# Patient Record
Sex: MALE | Race: ASIAN | NOT HISPANIC OR LATINO | URBAN - METROPOLITAN AREA
[De-identification: names, ages, dates, MRNs, and addresses within clinical notes are randomized per-mention and may not be internally consistent; named-entity substitution may affect disease eponyms.]

---

## 2017-01-25 ENCOUNTER — OUTPATIENT (OUTPATIENT)
Dept: OUTPATIENT SERVICES | Facility: HOSPITAL | Age: 67
LOS: 1 days | End: 2017-01-25
Payer: COMMERCIAL

## 2017-01-25 DIAGNOSIS — I25.118 ATHEROSCLEROTIC HEART DISEASE OF NATIVE CORONARY ARTERY WITH OTHER FORMS OF ANGINA PECTORIS: ICD-10-CM

## 2017-01-25 PROCEDURE — 78452 HT MUSCLE IMAGE SPECT MULT: CPT

## 2017-01-25 PROCEDURE — A9502: CPT

## 2017-01-25 PROCEDURE — 93017 CV STRESS TEST TRACING ONLY: CPT

## 2019-06-28 VITALS
TEMPERATURE: 98 F | DIASTOLIC BLOOD PRESSURE: 67 MMHG | HEIGHT: 69 IN | OXYGEN SATURATION: 100 % | HEART RATE: 54 BPM | WEIGHT: 134.04 LBS | RESPIRATION RATE: 16 BRPM | SYSTOLIC BLOOD PRESSURE: 115 MMHG

## 2019-06-28 RX ORDER — CHLORHEXIDINE GLUCONATE 213 G/1000ML
1 SOLUTION TOPICAL ONCE
Refills: 0 | Status: DISCONTINUED | OUTPATIENT
Start: 2019-07-01 | End: 2019-07-01

## 2019-06-28 NOTE — H&P ADULT - NSICDXPASTMEDICALHX_GEN_ALL_CORE_FT
PAST MEDICAL HISTORY:  CAD (coronary artery disease)     DM (diabetes mellitus)     HLD (hyperlipidemia)     HTN (hypertension)

## 2019-06-28 NOTE — H&P ADULT - ASSESSMENT
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

## 2019-06-28 NOTE — H&P ADULT - HISTORY OF PRESENT ILLNESS
69 y o male former smoker with PMH of CAD s/p CABG in 2004 with subsequent POBA of distal LIMA to LAD in 5/2007, s/p PCI in 5/2007 pLAD 100%, pLcx 90%, mLCx 100%, to LCX, HLD, HTN, DM presented to his doctor c/o exertional left-sided mild to moderate in intensity non-radiating CP with ambulation less than 1/2 block. Pt denies SOB, dizziness, palpitations, LE edema, PND/orthopnea, syncope, n/v, fever/chills, blood in the stool. Last ECHO done on 2/17 showed normal LV size and systolic function, basal inferior wall hypokinesis with EF of 62 %, grade I diastolic dysfunction, mild MR/TR/NE. NST done on 2/17 indicated LV normal; small mild defect in distal inferior wall that is partially reversible, distal inferior wall hypokinesis with EF of 70%. In light of pt's risk factors, symptoms mentioned above, abnormal NST and prior history of CAD, pt is now referred to [facility] for recommended cardiac cath with possible intervention. skeleton    69 y o male former smoker with PMH of CAD s/p CABG in 2004 with subsequent POBA of distal LIMA to LAD in 5/2007, s/p PCI in 5/2007 pLAD 100%, pLcx 90%, mLCx 100%, to LCX, HLD, HTN, DM presented to his doctor c/o exertional left-sided mild to moderate in intensity non-radiating CP with ambulation less than 1/2 block. Pt denies SOB, dizziness, palpitations, LE edema, PND/orthopnea, syncope, n/v, fever/chills, blood in the stool. Last ECHO done on 2/17 showed normal LV size and systolic function, basal inferior wall hypokinesis with EF of 62 %, grade I diastolic dysfunction, mild MR/TR/TX. NST done on 2/17 indicated LV normal; small mild defect in distal inferior wall that is partially reversible, distal inferior wall hypokinesis with EF of 70%. In light of pt's risk factors, symptoms mentioned above, abnormal NST and prior history of CAD, pt is now referred to [facility] for recommended cardiac cath with possible intervention. 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 Pt denies SOB, dizziness, palpitations, LE edema, PND/orthopnea, syncope, n/v, fever/chills, blood in the stool. Last ECHO done on 2/17 showed normal LV size and systolic function, basal inferior wall hypokinesis with EF of 62 %, grade I diastolic dysfunction, mild MR/TR/SD. NST done on 2/17 indicated LV normal; small mild defect in distal inferior wall that is partially reversible, distal inferior wall hypokinesis with EF of 70%. In light of pt's risk factors, symptoms mentioned above, abnormal NST and prior history of CAD, pt is now referred to [facility] for recommended cardiac cath with possible intervention. 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 Pt denies SOB, dizziness, palpitations, LE edema, PND/orthopnea, syncope, n/v, fever/chills, blood in the stool. Last ECHO done in 2/2017 showed normal LV size and systolic function, basal inferior wall hypokinesis with EF of 62 %, grade I diastolic dysfunction, mild MR/TR/NM. NST also done in 2/17 indicated LV normal; small mild defect in distal inferior wall that is partially reversible, distal inferior wall hypokinesis with EF of 70%.  Pt deferred cardiac cath at the time.    In light of pt's risk factors, symptoms mentioned above, abnormal NST and prior history of CAD, pt is now referred to [facility] for recommended cardiac cath with possible intervention for suspected CAD progression.

## 2019-07-01 ENCOUNTER — OUTPATIENT (OUTPATIENT)
Dept: OUTPATIENT SERVICES | Facility: HOSPITAL | Age: 69
LOS: 1 days | Discharge: NURSING, REHAB OR LONG-TERM CARE | End: 2019-07-01
Payer: MEDICARE

## 2019-07-01 DIAGNOSIS — Z95.1 PRESENCE OF AORTOCORONARY BYPASS GRAFT: Chronic | ICD-10-CM

## 2019-07-01 LAB
ALBUMIN SERPL ELPH-MCNC: 4.4 G/DL — SIGNIFICANT CHANGE UP (ref 3.3–5)
ALP SERPL-CCNC: 68 U/L — SIGNIFICANT CHANGE UP (ref 40–120)
ALT FLD-CCNC: 12 U/L — SIGNIFICANT CHANGE UP (ref 10–45)
ANION GAP SERPL CALC-SCNC: 10 MMOL/L — SIGNIFICANT CHANGE UP (ref 5–17)
APTT BLD: 27.6 SEC — SIGNIFICANT CHANGE UP (ref 27.5–36.3)
AST SERPL-CCNC: 17 U/L — SIGNIFICANT CHANGE UP (ref 10–40)
BASOPHILS # BLD AUTO: 0.05 K/UL — SIGNIFICANT CHANGE UP (ref 0–0.2)
BASOPHILS NFR BLD AUTO: 0.6 % — SIGNIFICANT CHANGE UP (ref 0–2)
BILIRUB SERPL-MCNC: 0.4 MG/DL — SIGNIFICANT CHANGE UP (ref 0.2–1.2)
BUN SERPL-MCNC: 14 MG/DL — SIGNIFICANT CHANGE UP (ref 7–23)
CALCIUM SERPL-MCNC: 9 MG/DL — SIGNIFICANT CHANGE UP (ref 8.4–10.5)
CHLORIDE SERPL-SCNC: 106 MMOL/L — SIGNIFICANT CHANGE UP (ref 96–108)
CHOLEST SERPL-MCNC: 104 MG/DL — SIGNIFICANT CHANGE UP (ref 10–199)
CK MB CFR SERPL CALC: 3.3 NG/ML — SIGNIFICANT CHANGE UP (ref 0–6.7)
CK SERPL-CCNC: 143 U/L — SIGNIFICANT CHANGE UP (ref 30–200)
CO2 SERPL-SCNC: 25 MMOL/L — SIGNIFICANT CHANGE UP (ref 22–31)
CREAT SERPL-MCNC: 0.81 MG/DL — SIGNIFICANT CHANGE UP (ref 0.5–1.3)
CRP SERPL-MCNC: <0.03 MG/DL — SIGNIFICANT CHANGE UP (ref 0–0.4)
EOSINOPHIL # BLD AUTO: 0.27 K/UL — SIGNIFICANT CHANGE UP (ref 0–0.5)
EOSINOPHIL NFR BLD AUTO: 3.1 % — SIGNIFICANT CHANGE UP (ref 0–6)
GLUCOSE BLDC GLUCOMTR-MCNC: 91 MG/DL — SIGNIFICANT CHANGE UP (ref 70–99)
GLUCOSE SERPL-MCNC: 82 MG/DL — SIGNIFICANT CHANGE UP (ref 70–99)
HBA1C BLD-MCNC: 7.3 % — HIGH (ref 4–5.6)
HCT VFR BLD CALC: 34.4 % — LOW (ref 39–50)
HDLC SERPL-MCNC: 51 MG/DL — SIGNIFICANT CHANGE UP
HGB BLD-MCNC: 10.7 G/DL — LOW (ref 13–17)
IMM GRANULOCYTES NFR BLD AUTO: 0.3 % — SIGNIFICANT CHANGE UP (ref 0–1.5)
INR BLD: 1 — SIGNIFICANT CHANGE UP (ref 0.88–1.16)
LIPID PNL WITH DIRECT LDL SERPL: 40 MG/DL — SIGNIFICANT CHANGE UP
LYMPHOCYTES # BLD AUTO: 2.83 K/UL — SIGNIFICANT CHANGE UP (ref 1–3.3)
LYMPHOCYTES # BLD AUTO: 32.9 % — SIGNIFICANT CHANGE UP (ref 13–44)
MCHC RBC-ENTMCNC: 25.2 PG — LOW (ref 27–34)
MCHC RBC-ENTMCNC: 31.1 GM/DL — LOW (ref 32–36)
MCV RBC AUTO: 81.1 FL — SIGNIFICANT CHANGE UP (ref 80–100)
MONOCYTES # BLD AUTO: 0.81 K/UL — SIGNIFICANT CHANGE UP (ref 0–0.9)
MONOCYTES NFR BLD AUTO: 9.4 % — SIGNIFICANT CHANGE UP (ref 2–14)
NEUTROPHILS # BLD AUTO: 4.61 K/UL — SIGNIFICANT CHANGE UP (ref 1.8–7.4)
NEUTROPHILS NFR BLD AUTO: 53.7 % — SIGNIFICANT CHANGE UP (ref 43–77)
NRBC # BLD: 0 /100 WBCS — SIGNIFICANT CHANGE UP (ref 0–0)
PLATELET # BLD AUTO: 198 K/UL — SIGNIFICANT CHANGE UP (ref 150–400)
POTASSIUM SERPL-MCNC: 4.2 MMOL/L — SIGNIFICANT CHANGE UP (ref 3.5–5.3)
POTASSIUM SERPL-SCNC: 4.2 MMOL/L — SIGNIFICANT CHANGE UP (ref 3.5–5.3)
PROT SERPL-MCNC: 7.4 G/DL — SIGNIFICANT CHANGE UP (ref 6–8.3)
PROTHROM AB SERPL-ACNC: 11.3 SEC — SIGNIFICANT CHANGE UP (ref 10–12.9)
RBC # BLD: 4.24 M/UL — SIGNIFICANT CHANGE UP (ref 4.2–5.8)
RBC # FLD: 18.3 % — HIGH (ref 10.3–14.5)
SODIUM SERPL-SCNC: 141 MMOL/L — SIGNIFICANT CHANGE UP (ref 135–145)
TOTAL CHOLESTEROL/HDL RATIO MEASUREMENT: 2 RATIO — LOW (ref 3.4–9.6)
TRIGL SERPL-MCNC: 64 MG/DL — SIGNIFICANT CHANGE UP (ref 10–149)
WBC # BLD: 8.6 K/UL — SIGNIFICANT CHANGE UP (ref 3.8–10.5)
WBC # FLD AUTO: 8.6 K/UL — SIGNIFICANT CHANGE UP (ref 3.8–10.5)

## 2019-07-01 PROCEDURE — 85610 PROTHROMBIN TIME: CPT

## 2019-07-01 PROCEDURE — 82550 ASSAY OF CK (CPK): CPT

## 2019-07-01 PROCEDURE — C1894: CPT

## 2019-07-01 PROCEDURE — 80061 LIPID PANEL: CPT

## 2019-07-01 PROCEDURE — 85025 COMPLETE CBC W/AUTO DIFF WBC: CPT

## 2019-07-01 PROCEDURE — 82962 GLUCOSE BLOOD TEST: CPT

## 2019-07-01 PROCEDURE — 93459 L HRT ART/GRFT ANGIO: CPT

## 2019-07-01 PROCEDURE — C1769: CPT

## 2019-07-01 PROCEDURE — 80053 COMPREHEN METABOLIC PANEL: CPT

## 2019-07-01 PROCEDURE — 93005 ELECTROCARDIOGRAM TRACING: CPT

## 2019-07-01 PROCEDURE — 86140 C-REACTIVE PROTEIN: CPT

## 2019-07-01 PROCEDURE — C1887: CPT

## 2019-07-01 PROCEDURE — 82553 CREATINE MB FRACTION: CPT

## 2019-07-01 PROCEDURE — 99203 OFFICE O/P NEW LOW 30 MIN: CPT

## 2019-07-01 PROCEDURE — 83036 HEMOGLOBIN GLYCOSYLATED A1C: CPT

## 2019-07-01 PROCEDURE — 85730 THROMBOPLASTIN TIME PARTIAL: CPT

## 2019-07-01 PROCEDURE — 93459 L HRT ART/GRFT ANGIO: CPT | Mod: 26

## 2019-07-01 PROCEDURE — 93010 ELECTROCARDIOGRAM REPORT: CPT

## 2019-07-01 RX ORDER — SODIUM CHLORIDE 9 MG/ML
500 INJECTION INTRAMUSCULAR; INTRAVENOUS; SUBCUTANEOUS
Refills: 0 | Status: DISCONTINUED | OUTPATIENT
Start: 2019-07-01 | End: 2019-07-01

## 2019-07-01 RX ORDER — CLOPIDOGREL BISULFATE 75 MG/1
75 TABLET, FILM COATED ORAL ONCE
Refills: 0 | Status: COMPLETED | OUTPATIENT
Start: 2019-07-01 | End: 2019-07-01

## 2019-07-01 RX ORDER — ISOSORBIDE MONONITRATE 60 MG/1
1 TABLET, EXTENDED RELEASE ORAL
Qty: 0 | Refills: 0 | DISCHARGE

## 2019-07-01 RX ORDER — METFORMIN HYDROCHLORIDE 850 MG/1
1 TABLET ORAL
Qty: 0 | Refills: 0 | DISCHARGE

## 2019-07-01 RX ORDER — ATORVASTATIN CALCIUM 80 MG/1
1 TABLET, FILM COATED ORAL
Qty: 0 | Refills: 0 | DISCHARGE

## 2019-07-01 RX ORDER — COLESTIPOL HCL 5 G
1 GRANULES (GRAM) ORAL
Qty: 0 | Refills: 0 | DISCHARGE

## 2019-07-01 RX ORDER — GLIMEPIRIDE 1 MG
1 TABLET ORAL
Qty: 0 | Refills: 0 | DISCHARGE

## 2019-07-01 RX ORDER — RANITIDINE HYDROCHLORIDE 150 MG/1
1 TABLET, FILM COATED ORAL
Qty: 0 | Refills: 0 | DISCHARGE

## 2019-07-01 RX ORDER — METOPROLOL TARTRATE 50 MG
1 TABLET ORAL
Qty: 0 | Refills: 0 | DISCHARGE

## 2019-07-01 RX ORDER — CLOPIDOGREL BISULFATE 75 MG/1
1 TABLET, FILM COATED ORAL
Qty: 0 | Refills: 0 | DISCHARGE

## 2019-07-01 RX ORDER — ASPIRIN/CALCIUM CARB/MAGNESIUM 324 MG
325 TABLET ORAL ONCE
Refills: 0 | Status: COMPLETED | OUTPATIENT
Start: 2019-07-01 | End: 2019-07-01

## 2019-07-01 RX ORDER — COLESTIPOL HCL 5 G
2 GRANULES (GRAM) ORAL
Qty: 0 | Refills: 0 | DISCHARGE

## 2019-07-01 RX ADMIN — Medication 325 MILLIGRAM(S): at 14:39

## 2019-07-01 RX ADMIN — SODIUM CHLORIDE 75 MILLILITER(S): 9 INJECTION INTRAMUSCULAR; INTRAVENOUS; SUBCUTANEOUS at 14:39

## 2019-07-01 RX ADMIN — CLOPIDOGREL BISULFATE 75 MILLIGRAM(S): 75 TABLET, FILM COATED ORAL at 14:38

## 2019-07-01 NOTE — CONSULT NOTE ADULT - SUBJECTIVE AND OBJECTIVE BOX
Preventive Cardiology Consultation Note    Cardiologist - Dr. 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 Patient is now s/p cardiac cath today 7/1/19    Review of systems otherwise negative.     Lifestyle History:  Mediterranean Diet Score (9 question survey) was 5.   (8-9: optimal, 6-7: near-optimal, 4-5: suboptimal, 0-3: markedly suboptimal)  Exercise: Patient reports exercising at a moderate level for 120-150 minutes per week.   Smoking: Former smoker (1 PPD x 20 years; quit 12 years ago).  Stress: Patient denies any stress.     PAST MEDICAL & SURGICAL HISTORY:  CAD (coronary artery disease)  DM (diabetes mellitus)  HLD (hyperlipidemia)  HTN (hypertension)  S/P CABG (coronary artery bypass graft)    FAMILY HISTORY:   Patient denies any first degree family history of premature CAD or CVA.     Allergies:   No Known Allergies      HOME MEDICATIONS:   colestipol 1 g oral tablet: 1 tab(s) orally 2 times a day (01 Jul 2019 14:19)  glimepiride 2 mg oral tablet: 1 tab(s) orally once a day (01 Jul 2019 14:19)  Imdur 60 mg oral tablet, extended release: 1 tab(s) orally 2 times a day (01 Jul 2019 14:19)  Lipitor 40 mg oral tablet: 1 tab(s) orally once a day (01 Jul 2019 14:19)  metFORMIN 500 mg oral tablet, extended release: 1 tab(s) orally once a day (01 Jul 2019 14:19)  Plavix 75 mg oral tablet: 1 tab(s) orally once a day (01 Jul 2019 14:19)  raNITIdine 150 mg oral capsule: 1 cap(s) orally 2 times a day (01 Jul 2019 14:19)  Toprol-XL 50 mg oral tablet, extended release: 1 tab(s) orally once a day (01 Jul 2019 14:19)      PHYSICAL EXAM:  · Temp (F)	97.9 Degrees F	  · Temp (C)	36.6 Degrees C	  · Heart Rate	  54 /min	  · Respiration Rate (breaths/min)	16 /min	  · BP Systolic	115 mm Hg	  · BP Diastolic	67 mm Hg	  · BP Noninvasive Mean	83 mm Hg	  · SpO2 (%)	100 %	  · O2 delivery	room air 	    Height (cm): 175.26 (07-01 @ 14:26)  Weight (kg): 60.8 (07-01 @ 14:26)  BMI (kg/m2): 19.8 (07-01 @ 14:26)  	  Gen- awake, conversive  Head-NCAT; eyes: no corneal arcus noted b/l; no xanthelasmas   Neck- no thyromegaly, no cervical LAD, no JVD, no carotid bruit b/l  Respiratory- good air entry b/l, no WRR  Cardiovascular- S1S2, RRR, no MRG appr, no LE edema b/l, distal pulses 2+ b/l  Gastrointestinal- no HSM, no masses  Neurology- moves all extremities, no focal deficits  Psych- normal affect, non-depressed mood  Skin- no lesions, no rashes, no xanthomas     LABS:	                        10.7   8.60  )-----------( 198      ( 01 Jul 2019 13:34 )             34.4     07-01    141  |  106  |  14  ----------------------------<  82  4.2   |  25  |  0.81    Ca    9.0      01 Jul 2019 13:34    TPro  7.4  /  Alb  4.4  /  TBili  0.4  /  DBili  x   /  AST  17  /  ALT  12  /  AlkPhos  68  07-01      Hemoglobin A1C, Whole Blood: 7.3 % (07-01 @ 13:34)      Cholesterol, Serum: 104 mg/dL (07-01 @ 13:34)  HDL Cholesterol, Serum: 51 mg/dL (07-01 @ 13:34)  Triglycerides, Serum: 64 mg/dL (07-01 @ 13:34)  Direct LDL: 40 mg/dL (07-01 @ 13:34)    C-Reactive Protein, Serum: <0.03 mg/dL (07-01 @ 13:34)      ASSESSMENT/RECOMMENDATIONS: 	  Patient's dietary, exercise and overall lifestyle habits were reviewed. The concept of atherosclerosis and its systemic nature was discussed with a focus on the need to get all cardiovascular risk factors to goal. At this time, I would like to make the following recommendations to optimize atherosclerotic risk factors.     RECOMMENDATIONS:   Anti-platelet Therapy: DAPT per interventionalist recommendation.   Lipid Therapy: Patient is currently taking atorvastatin 40mg daily and is compliant and tolerating it well. We believe this is an appropriate medication for him at this time. His current lipid profile is at goal, and lifestyle modifications will likely benefit him further. It would be reasonable to consider either increasing the statin dosage and/or adding Zetia 10mg daily to further optimize his medication regimen, if needed in the future to keep his LDL level <70 mg/dL.   Hypertension: Blood pressures during this stay were well-controlled.   Mediterranean Diet Score is 5. Some suggestions include continue incorporating 2 or more servings per day of vegetables, fruits, and whole grains. Increase intake of fish and legumes/beans to 2 or more servings per week. Aim to increase intake of healthy fats, such as olive oil and avocados, and have a handful of nuts/seeds most days. Reduce red/processed meat consumption to 2 or fewer times per week.    Exercise: Recommended gradually increasing activity to 30-45 minutes most days of the week once cleared by referring cardiologist. Cardiac rehab might benefit this patient and is covered by major insurance plans (other than co-pays), please refer.   Medication Adherence: Patient has no issues with adherence at this time.   Smoking: This patient is a non-smoker.   Obesity/Overweight: The patient is at a normal weight currently, and BMI is WNL at 19.8.  Glucometabolic State: Patient's blood sugar appears to be mostly well-controlled on the current regimen at this time. Recent HbA1c was 7.3%. Depending on discussions with patient's PCP and/or endocrinologist, we would recommend the possibility of switching from glimepiride to a GLP-1 agonist or SGLT-2 inhibitor, as these newer agents have cardiovascular benefits as well as glucose control.   Sleep Apnea: The patient is at low risk for sleep apnea.   Psychological Stress: The patient appears to be coping with stressors well at this time.     Thank you for the opportunity to see this patient. Please feel free to contact Prevention if there are any questions, or if you feel that your patient would benefit from continued follow-up visits with the Program.    I am acting as a scribe on behalf of Dr. Yessica Amezquita,    Kristy Hou, [facility]  Cardiovascular Prevention     Yessica Amezquita MD  System Director, Cardiovascular Prevention Preventive Cardiology Consultation Note    Cardiologist - Dr. 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 Patient is now s/p cardiac cath today 7/1/19: LM normal, % with patent LIMA-LAD, LCx 100%  w/ L-L and R-L collaterals, patent m/dRCA BMS, LV normal. Pt will return in 2-4 weeks for staged PCI of LCx .      Review of systems otherwise negative.     Lifestyle History:  Mediterranean Diet Score (9 question survey) was 5.   (8-9: optimal, 6-7: near-optimal, 4-5: suboptimal, 0-3: markedly suboptimal)  Exercise: Patient reports exercising at a moderate level for 120-150 minutes per week.   Smoking: Former smoker (1 PPD x 20 years; quit 12 years ago).  Stress: Patient denies any stress.     PAST MEDICAL & SURGICAL HISTORY:  CAD (coronary artery disease)  DM (diabetes mellitus)  HLD (hyperlipidemia)  HTN (hypertension)  S/P CABG (coronary artery bypass graft)    FAMILY HISTORY:   Patient denies any first degree family history of premature CAD or CVA.     Allergies:   No Known Allergies      HOME MEDICATIONS:   colestipol 1 g oral tablet: 1 tab(s) orally 2 times a day (01 Jul 2019 14:19)  glimepiride 2 mg oral tablet: 1 tab(s) orally once a day (01 Jul 2019 14:19)  Imdur 60 mg oral tablet, extended release: 1 tab(s) orally 2 times a day (01 Jul 2019 14:19)  Lipitor 40 mg oral tablet: 1 tab(s) orally once a day (01 Jul 2019 14:19)  metFORMIN 500 mg oral tablet, extended release: 1 tab(s) orally once a day (01 Jul 2019 14:19)  Plavix 75 mg oral tablet: 1 tab(s) orally once a day (01 Jul 2019 14:19)  raNITIdine 150 mg oral capsule: 1 cap(s) orally 2 times a day (01 Jul 2019 14:19)  Toprol-XL 50 mg oral tablet, extended release: 1 tab(s) orally once a day (01 Jul 2019 14:19)      PHYSICAL EXAM:  · Temp (F)	97.9 Degrees F	  · Temp (C)	36.6 Degrees C	  · Heart Rate	  54 /min	  · Respiration Rate (breaths/min)	16 /min	  · BP Systolic	115 mm Hg	  · BP Diastolic	67 mm Hg	  · BP Noninvasive Mean	83 mm Hg	  · SpO2 (%)	100 %	  · O2 delivery	room air 	    Height (cm): 175.26 (07-01 @ 14:26)  Weight (kg): 60.8 (07-01 @ 14:26)  BMI (kg/m2): 19.8 (07-01 @ 14:26)  	  Gen- awake, conversive  Head-NCAT; eyes: no corneal arcus noted b/l; no xanthelasmas   Neck- no thyromegaly, no cervical LAD, no JVD, no carotid bruit b/l  Respiratory- good air entry b/l, no WRR  Cardiovascular- S1S2, RRR, no MRG appr, no LE edema b/l, distal pulses 2+ b/l  Gastrointestinal- no HSM, no masses  Neurology- moves all extremities, no focal deficits  Psych- normal affect, non-depressed mood  Skin- no lesions, no rashes, no xanthomas     LABS:	                        10.7   8.60  )-----------( 198      ( 01 Jul 2019 13:34 )             34.4     07-01    141  |  106  |  14  ----------------------------<  82  4.2   |  25  |  0.81    Ca    9.0      01 Jul 2019 13:34    TPro  7.4  /  Alb  4.4  /  TBili  0.4  /  DBili  x   /  AST  17  /  ALT  12  /  AlkPhos  68  07-01      Hemoglobin A1C, Whole Blood: 7.3 % (07-01 @ 13:34)      Cholesterol, Serum: 104 mg/dL (07-01 @ 13:34)  HDL Cholesterol, Serum: 51 mg/dL (07-01 @ 13:34)  Triglycerides, Serum: 64 mg/dL (07-01 @ 13:34)  Direct LDL: 40 mg/dL (07-01 @ 13:34)    C-Reactive Protein, Serum: <0.03 mg/dL (07-01 @ 13:34)      ASSESSMENT/RECOMMENDATIONS: 	  Patient's dietary, exercise and overall lifestyle habits were reviewed. The concept of atherosclerosis and its systemic nature was discussed with a focus on the need to get all cardiovascular risk factors to goal. At this time, I would like to make the following recommendations to optimize atherosclerotic risk factors.     RECOMMENDATIONS:   Anti-platelet Therapy: APT per interventionalist recommendation.   Lipid Therapy: Patient is currently taking atorvastatin 40mg daily and is compliant and tolerating it well. We believe this is an appropriate medication for him at this time. His current lipid profile is at goal, and lifestyle modifications will likely benefit him further. It would be reasonable to consider either increasing the statin dosage and/or adding Zetia 10mg daily to further optimize his medication regimen, if needed in the future to keep his LDL level <70 mg/dL.   Hypertension: Blood pressures during this stay were well-controlled.   Mediterranean Diet Score is 5. Some suggestions include continue incorporating 2 or more servings per day of vegetables, fruits, and whole grains. Increase intake of fish and legumes/beans to 2 or more servings per week. Aim to increase intake of healthy fats, such as olive oil and avocados, and have a handful of nuts/seeds most days. Reduce red/processed meat consumption to 2 or fewer times per week.    Exercise: Recommended gradually increasing activity to 30-45 minutes most days of the week once cleared by referring cardiologist. Cardiac rehab might benefit this patient after staged PCI and is covered by major insurance plans (other than co-pays), please refer.   Medication Adherence: Patient has no issues with adherence at this time.   Smoking: This patient is a non-smoker.   Obesity/Overweight: The patient is at a normal weight currently, and BMI is WNL at 19.8.  Glucometabolic State: Patient's blood sugar appears to be mostly well-controlled on the current regimen at this time. Recent HbA1c was 7.3%. Depending on discussions with patient's PCP and/or endocrinologist, we would recommend the possibility of switching from glimepiride to a GLP-1 agonist or SGLT-2 inhibitor, as these newer agents have cardiovascular benefits as well as glucose control.   Sleep Apnea: The patient is at low risk for sleep apnea.   Psychological Stress: The patient appears to be coping with stressors well at this time.     Thank you for the opportunity to see this patient. Please feel free to contact Prevention if there are any questions, or if you feel that your patient would benefit from continued follow-up visits with the Program.    I am acting as a scribe on behalf of Dr. Yessica Amezquita,    Kristy Hou, [facility]  Cardiovascular Prevention     Yessica Amezquita MD  System Director, Cardiovascular Prevention

## 2019-07-01 NOTE — PROGRESS NOTE ADULT - SUBJECTIVE AND OBJECTIVE BOX
Interventional Cardiology PA SDA Discharge Note    Patient without complaints. 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 Pt denies SOB, dizziness, palpitations, LE edema, PND/orthopnea, syncope, n/v, fever/chills, blood in the stool. Last ECHO done in 2/2017 showed normal LV size and systolic function, basal inferior wall hypokinesis with EF of 62 %, grade I diastolic dysfunction, mild MR/TR/LA. NST also done in 2/17 indicated LV normal; small mild defect in distal inferior wall that is partially reversible, distal inferior wall hypokinesis with EF of 70%.  Pt deferred cardiac cath at the time.    In light of pt's risk factors, symptoms mentioned above, abnormal NST and prior history of CAD, pt is now referred to [facility] for recommended cardiac cath with possible intervention for suspected CAD progression.   Pt is now s/p dx cardiac cath on 7/1/19:        1.	Stable for discharge as per attending Dr. Curtis after bed rest, pt voids, wrist stable and 30 minutes of ambulation.  2.	Follow-up with PMD/Cardiologist Dr. Reis in 1-2 weeks  3.	Discharged forms signed and copies in chart Interventional Cardiology PA SDA Discharge Note    Patient without complaints. 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 with subsequent POBA of distal LIMA to LAD in 5/2007 in [facility], s/p PCI in 5/2007 pLAD 100%, pLcx 90%, mLCx 100%, to LCX, HLD, HTN, DM, s/p colon CA (no radiation, no chemo?) presented to his doctor c/o exertional left-sided mild to moderate in intensity non-radiating CP with ambulation less than 1/2 block. Pt denies SOB, dizziness, palpitations, LE edema, PND/orthopnea, syncope, n/v, fever/chills, blood in the stool. Last ECHO done in 2/2017 showed normal LV size and systolic function, basal inferior wall hypokinesis with EF of 62 %, grade I diastolic dysfunction, mild MR/TR/VA. NST also done in 2/17 indicated LV normal; small mild defect in distal inferior wall that is partially reversible, distal inferior wall hypokinesis with EF of 70%.  Pt deferred cardiac cath at the time.    In light of pt's risk factors, symptoms mentioned above, abnormal NST and prior history of CAD, pt is now referred to [facility] for recommended cardiac cath with possible intervention for suspected CAD progression.   Pt is now s/p dx cardiac cath on 7/1/19: LM normal, % with patent LIMA-LAD, LCx 100%  w/ L-L and R-L collaterals, patent m/dRCA BMS, LV normal. Pt will return in 2-4 weeks for staged PCI of LCx .      1.	Stable for discharge as per attending Dr. Curtis after bed rest, pt voids, wrist stable and 30 minutes of ambulation.  2.	Follow-up with PMD/Cardiologist Dr. Reis in 1-2 weeks  3.	Discharged forms signed and copies in chart

## 2019-07-11 DIAGNOSIS — I10 ESSENTIAL (PRIMARY) HYPERTENSION: ICD-10-CM

## 2019-07-11 DIAGNOSIS — Z82.49 FAMILY HISTORY OF ISCHEMIC HEART DISEASE AND OTHER DISEASES OF THE CIRCULATORY SYSTEM: ICD-10-CM

## 2019-07-11 DIAGNOSIS — E11.9 TYPE 2 DIABETES MELLITUS WITHOUT COMPLICATIONS: ICD-10-CM

## 2019-07-11 DIAGNOSIS — I25.110 ATHEROSCLEROTIC HEART DISEASE OF NATIVE CORONARY ARTERY WITH UNSTABLE ANGINA PECTORIS: ICD-10-CM

## 2019-07-11 DIAGNOSIS — Z95.1 PRESENCE OF AORTOCORONARY BYPASS GRAFT: ICD-10-CM

## 2019-07-11 DIAGNOSIS — I25.82 CHRONIC TOTAL OCCLUSION OF CORONARY ARTERY: ICD-10-CM

## 2019-07-11 DIAGNOSIS — R94.39 ABNORMAL RESULT OF OTHER CARDIOVASCULAR FUNCTION STUDY: ICD-10-CM

## 2019-07-11 DIAGNOSIS — E78.5 HYPERLIPIDEMIA, UNSPECIFIED: ICD-10-CM

## 2022-09-22 PROBLEM — I25.10 ATHEROSCLEROTIC HEART DISEASE OF NATIVE CORONARY ARTERY WITHOUT ANGINA PECTORIS: Chronic | Status: ACTIVE | Noted: 2019-06-28

## 2022-09-22 PROBLEM — I10 ESSENTIAL (PRIMARY) HYPERTENSION: Chronic | Status: ACTIVE | Noted: 2019-06-28

## 2022-09-22 PROBLEM — E78.5 HYPERLIPIDEMIA, UNSPECIFIED: Chronic | Status: ACTIVE | Noted: 2019-06-28

## 2022-09-22 PROBLEM — E11.9 TYPE 2 DIABETES MELLITUS WITHOUT COMPLICATIONS: Chronic | Status: ACTIVE | Noted: 2019-06-28

## 2022-10-12 ENCOUNTER — APPOINTMENT (OUTPATIENT)
Dept: UROLOGY | Facility: CLINIC | Age: 72
End: 2022-10-12

## 2022-10-12 VITALS
HEIGHT: 69 IN | HEART RATE: 63 BPM | BODY MASS INDEX: 19.85 KG/M2 | TEMPERATURE: 97.9 F | WEIGHT: 134 LBS | DIASTOLIC BLOOD PRESSURE: 65 MMHG | SYSTOLIC BLOOD PRESSURE: 127 MMHG

## 2022-10-12 DIAGNOSIS — R97.20 ELEVATED PROSTATE, SPECIFIC ANTIGEN [PSA]: ICD-10-CM

## 2022-10-12 PROCEDURE — 99204 OFFICE O/P NEW MOD 45 MIN: CPT

## 2022-10-16 ENCOUNTER — NON-APPOINTMENT (OUTPATIENT)
Age: 72
End: 2022-10-16

## 2022-10-18 NOTE — ASSESSMENT
[FreeTextEntry1] : The natural history of prostate cancer and ongoing controversy regarding screening and potential treatment outcomes of prostate cancer has been discussed with the patient. The meaning of a false positive PSA and a false negative PSA has been discussed. He indicates understanding of the limitations of this screening test \par REviewed ptions continue  surveillance , biopsy or mri \par Risks and benefits reviewed\par will get mri to eval for possible fussion biopsy

## 2022-10-18 NOTE — HISTORY OF PRESENT ILLNESS
[FreeTextEntry1] : Elevated PSA \par Patient is here with an elevated PSA. He has no personal history and no family history of prostate cancer.He has no prior genitourinary history of hematuria, hematospermia, prostatitis, UTI, erectile dysfunction, urolithiasis, epididymal orchitis. \par C/o frequency nocturia \par No dysuria or hematuria\par \par psa 4.63 \par prior neg bx 2016

## 2024-07-16 ENCOUNTER — OUTPATIENT (OUTPATIENT)
Dept: OUTPATIENT SERVICES | Facility: HOSPITAL | Age: 74
LOS: 1 days | End: 2024-07-16

## 2024-07-16 ENCOUNTER — RESULT REVIEW (OUTPATIENT)
Age: 74
End: 2024-07-16

## 2024-07-16 DIAGNOSIS — Z95.1 PRESENCE OF AORTOCORONARY BYPASS GRAFT: Chronic | ICD-10-CM

## 2024-07-16 DIAGNOSIS — I25.118 ATHEROSCLEROTIC HEART DISEASE OF NATIVE CORONARY ARTERY WITH OTHER FORMS OF ANGINA PECTORIS: ICD-10-CM

## 2024-07-16 PROCEDURE — 78452 HT MUSCLE IMAGE SPECT MULT: CPT | Mod: MC

## 2024-07-16 PROCEDURE — A9502: CPT

## 2024-07-16 PROCEDURE — 93017 CV STRESS TEST TRACING ONLY: CPT
